# Patient Record
Sex: FEMALE | Race: BLACK OR AFRICAN AMERICAN
[De-identification: names, ages, dates, MRNs, and addresses within clinical notes are randomized per-mention and may not be internally consistent; named-entity substitution may affect disease eponyms.]

---

## 2024-08-02 ENCOUNTER — RESULT REVIEW (OUTPATIENT)
Age: 24
End: 2024-08-02

## 2024-08-02 ENCOUNTER — OUTPATIENT (OUTPATIENT)
Dept: OUTPATIENT SERVICES | Facility: HOSPITAL | Age: 24
LOS: 1 days | End: 2024-08-02
Payer: COMMERCIAL

## 2024-08-02 ENCOUNTER — APPOINTMENT (OUTPATIENT)
Dept: PODIATRY | Facility: CLINIC | Age: 24
End: 2024-08-02

## 2024-08-02 ENCOUNTER — TRANSCRIPTION ENCOUNTER (OUTPATIENT)
Age: 24
End: 2024-08-02

## 2024-08-02 VITALS
DIASTOLIC BLOOD PRESSURE: 71 MMHG | BODY MASS INDEX: 26.98 KG/M2 | HEART RATE: 61 BPM | WEIGHT: 158 LBS | HEIGHT: 64 IN | SYSTOLIC BLOOD PRESSURE: 105 MMHG

## 2024-08-02 DIAGNOSIS — M79.673 PAIN IN UNSPECIFIED FOOT: ICD-10-CM

## 2024-08-02 DIAGNOSIS — G89.29 PAIN IN UNSPECIFIED FOOT: ICD-10-CM

## 2024-08-02 DIAGNOSIS — Z00.00 ENCOUNTER FOR GENERAL ADULT MEDICAL EXAMINATION WITHOUT ABNORMAL FINDINGS: ICD-10-CM

## 2024-08-02 PROCEDURE — 73610 X-RAY EXAM OF ANKLE: CPT | Mod: 26,LT,RT

## 2024-08-02 PROCEDURE — 99204 OFFICE O/P NEW MOD 45 MIN: CPT

## 2024-08-02 PROCEDURE — 73630 X-RAY EXAM OF FOOT: CPT | Mod: 50

## 2024-08-02 PROCEDURE — 73630 X-RAY EXAM OF FOOT: CPT | Mod: 26,LT,RT

## 2024-08-02 PROCEDURE — 73610 X-RAY EXAM OF ANKLE: CPT | Mod: 50

## 2024-08-02 NOTE — PHYSICAL EXAM
[Ankle Swelling (On Exam)] : not present [Varicose Veins Of Lower Extremities] : not present [Delayed in the Right Toes] : capillary refills normal in right toes [Delayed in the Left Toes] : capillary refills normal in the left toes [2+] : left foot dorsalis pedis 2+ [No Joint Swelling] : no joint swelling [Pes Planus] : pes planus deformity [Manjit's Test For Radial Artery Patency Bilaterally] : positive bilateral [de-identified] : Normal ROM to bilateral Ankle, STJ, 1st MTP,  [Skin Color & Pigmentation] : normal skin color and pigmentation [Skin Turgor] : normal skin turgor [] : no rash [Skin Lesions] : no skin lesions [Foot Ulcer] : no foot ulcer [Skin Induration] : no skin induration [Sensation] : the sensory exam was normal to light touch and pinprick [No Focal Deficits] : no focal deficits [Deep Tendon Reflexes (DTR)] : deep tendon reflexes were 2+ and symmetric [Motor Exam] : the motor exam was normal

## 2024-08-02 NOTE — REASON FOR VISIT
[Initial Visit] : an initial visit for [Ankle Pain] : ankle pain [Foot Deformity] : foot deformity [Foot Pain] : foot pain [Pes Planus] : pes planus

## 2024-08-02 NOTE — ASSESSMENT
[FreeTextEntry1] : Pes planus  Chronic foot pain bilateral  Discusses conservative vs surgical options for flat feet; Deformity is correctable passively; ROM all normal. Patient interested in all conservative options at this time.  Recommend; Xray bilateral feet; xray bilateral ankle; Evaluate for pes planus Recommend; Low arch support powerstep orthotics vs custom orthotics  RTC 1 month for xray review and custom orthotic fitting [Verbal] : verbal [Patient] : patient [Good - alert, interested, motivated] : Good - alert, interested, motivated

## 2024-08-02 NOTE — HISTORY OF PRESENT ILLNESS
[Sneakers] : fabi [FreeTextEntry1] : 25 yo F who presents with chronic pain to bilateral feet at the medial aspect of both feet. States she has had "flat feet" since she was 6 years old. Has pain after standing for about 1 hour every day to both feet equally. Patient has not tried any treatments for her pain. Denies trauma. Has no other complaints at this time.

## 2024-08-03 DIAGNOSIS — M79.673 PAIN IN UNSPECIFIED FOOT: ICD-10-CM

## 2024-08-14 ENCOUNTER — NON-APPOINTMENT (OUTPATIENT)
Age: 24
End: 2024-08-14
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
## 2024-08-14 DIAGNOSIS — M21.41 FLAT FOOT [PES PLANUS] (ACQUIRED), RIGHT FOOT: ICD-10-CM
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
## 2024-08-14 DIAGNOSIS — M79.673 PAIN IN UNSPECIFIED FOOT: ICD-10-CM

## 2024-11-04 ENCOUNTER — APPOINTMENT (OUTPATIENT)
Dept: PODIATRY | Facility: CLINIC | Age: 24
End: 2024-11-04

## 2024-11-04 VITALS — BODY MASS INDEX: 27.83 KG/M2 | WEIGHT: 163 LBS | HEIGHT: 64 IN

## 2024-11-04 PROCEDURE — 99213 OFFICE O/P EST LOW 20 MIN: CPT

## 2024-12-02 ENCOUNTER — OUTPATIENT (OUTPATIENT)
Dept: OUTPATIENT SERVICES | Facility: HOSPITAL | Age: 24
LOS: 1 days | End: 2024-12-02
Payer: COMMERCIAL

## 2024-12-02 ENCOUNTER — APPOINTMENT (OUTPATIENT)
Dept: INTERNAL MEDICINE | Facility: CLINIC | Age: 24
End: 2024-12-02
Payer: COMMERCIAL

## 2024-12-02 VITALS
BODY MASS INDEX: 28.34 KG/M2 | WEIGHT: 166 LBS | HEART RATE: 67 BPM | TEMPERATURE: 97 F | DIASTOLIC BLOOD PRESSURE: 71 MMHG | SYSTOLIC BLOOD PRESSURE: 121 MMHG | HEIGHT: 64 IN | OXYGEN SATURATION: 100 %

## 2024-12-02 DIAGNOSIS — Z00.00 ENCOUNTER FOR GENERAL ADULT MEDICAL EXAMINATION WITHOUT ABNORMAL FINDINGS: ICD-10-CM

## 2024-12-02 DIAGNOSIS — G89.29 PAIN IN UNSPECIFIED FOOT: ICD-10-CM

## 2024-12-02 DIAGNOSIS — Z00.00 ENCOUNTER FOR GENERAL ADULT MEDICAL EXAMINATION W/OUT ABNORMAL FINDINGS: ICD-10-CM

## 2024-12-02 DIAGNOSIS — M79.673 PAIN IN UNSPECIFIED FOOT: ICD-10-CM

## 2024-12-02 PROCEDURE — G2211 COMPLEX E/M VISIT ADD ON: CPT | Mod: NC

## 2024-12-02 PROCEDURE — 99203 OFFICE O/P NEW LOW 30 MIN: CPT

## 2024-12-05 DIAGNOSIS — Z00.00 ENCOUNTER FOR GENERAL ADULT MEDICAL EXAMINATION WITHOUT ABNORMAL FINDINGS: ICD-10-CM

## 2024-12-05 DIAGNOSIS — M79.673 PAIN IN UNSPECIFIED FOOT: ICD-10-CM

## 2025-01-06 ENCOUNTER — APPOINTMENT (OUTPATIENT)
Dept: PODIATRY | Facility: CLINIC | Age: 25
End: 2025-01-06
Payer: COMMERCIAL

## 2025-01-06 ENCOUNTER — OUTPATIENT (OUTPATIENT)
Dept: OUTPATIENT SERVICES | Facility: HOSPITAL | Age: 25
LOS: 1 days | End: 2025-01-06
Payer: COMMERCIAL

## 2025-01-06 DIAGNOSIS — M79.673 PAIN IN UNSPECIFIED FOOT: ICD-10-CM

## 2025-01-06 DIAGNOSIS — G89.29 PAIN IN UNSPECIFIED FOOT: ICD-10-CM

## 2025-01-06 DIAGNOSIS — Z00.00 ENCOUNTER FOR GENERAL ADULT MEDICAL EXAMINATION WITHOUT ABNORMAL FINDINGS: ICD-10-CM

## 2025-01-06 PROCEDURE — 99213 OFFICE O/P EST LOW 20 MIN: CPT

## 2025-01-16 DIAGNOSIS — M79.673 PAIN IN UNSPECIFIED FOOT: ICD-10-CM

## 2025-01-16 DIAGNOSIS — R26.2 DIFFICULTY IN WALKING, NOT ELSEWHERE CLASSIFIED: ICD-10-CM

## 2025-01-16 DIAGNOSIS — M79.672 PAIN IN LEFT FOOT: ICD-10-CM

## 2025-01-16 DIAGNOSIS — M77.41 METATARSALGIA, RIGHT FOOT: ICD-10-CM

## 2025-01-16 DIAGNOSIS — M79.671 PAIN IN RIGHT FOOT: ICD-10-CM
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
## 2025-02-03 ENCOUNTER — APPOINTMENT (OUTPATIENT)
Dept: PODIATRY | Facility: CLINIC | Age: 25
End: 2025-02-03
Payer: COMMERCIAL

## 2025-02-03 ENCOUNTER — OUTPATIENT (OUTPATIENT)
Dept: OUTPATIENT SERVICES | Facility: HOSPITAL | Age: 25
LOS: 1 days | End: 2025-02-03
Payer: COMMERCIAL

## 2025-02-03 DIAGNOSIS — M79.673 PAIN IN UNSPECIFIED FOOT: ICD-10-CM

## 2025-02-03 DIAGNOSIS — Z00.00 ENCOUNTER FOR GENERAL ADULT MEDICAL EXAMINATION WITHOUT ABNORMAL FINDINGS: ICD-10-CM

## 2025-02-03 DIAGNOSIS — G89.29 PAIN IN UNSPECIFIED FOOT: ICD-10-CM

## 2025-02-03 PROCEDURE — 99213 OFFICE O/P EST LOW 20 MIN: CPT

## 2025-02-05 DIAGNOSIS — M79.673 PAIN IN UNSPECIFIED FOOT: ICD-10-CM
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
## 2025-02-24 ENCOUNTER — OUTPATIENT (OUTPATIENT)
Dept: OUTPATIENT SERVICES | Facility: HOSPITAL | Age: 25
LOS: 1 days | End: 2025-02-24
Payer: COMMERCIAL

## 2025-02-24 ENCOUNTER — APPOINTMENT (OUTPATIENT)
Dept: PODIATRY | Facility: CLINIC | Age: 25
End: 2025-02-24

## 2025-02-24 DIAGNOSIS — Z00.00 ENCOUNTER FOR GENERAL ADULT MEDICAL EXAMINATION WITHOUT ABNORMAL FINDINGS: ICD-10-CM

## 2025-02-24 PROCEDURE — 99213 OFFICE O/P EST LOW 20 MIN: CPT

## 2025-03-11 DIAGNOSIS — R26.2 DIFFICULTY IN WALKING, NOT ELSEWHERE CLASSIFIED: ICD-10-CM

## 2025-03-11 DIAGNOSIS — M79.672 PAIN IN LEFT FOOT: ICD-10-CM
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
## 2025-03-11 DIAGNOSIS — M77.40 METATARSALGIA, UNSPECIFIED FOOT: ICD-10-CM

## 2025-03-11 DIAGNOSIS — M79.671 PAIN IN RIGHT FOOT: ICD-10-CM

## 2025-03-13 ENCOUNTER — OUTPATIENT (OUTPATIENT)
Dept: OUTPATIENT SERVICES | Facility: HOSPITAL | Age: 25
LOS: 1 days | End: 2025-03-13
Payer: COMMERCIAL

## 2025-03-13 DIAGNOSIS — Z00.00 ENCOUNTER FOR GENERAL ADULT MEDICAL EXAMINATION WITHOUT ABNORMAL FINDINGS: ICD-10-CM

## 2025-03-13 PROCEDURE — 86762 RUBELLA ANTIBODY: CPT

## 2025-03-13 PROCEDURE — 84443 ASSAY THYROID STIM HORMONE: CPT

## 2025-03-13 PROCEDURE — 86765 RUBEOLA ANTIBODY: CPT

## 2025-03-13 PROCEDURE — 86706 HEP B SURFACE ANTIBODY: CPT

## 2025-03-13 PROCEDURE — 83036 HEMOGLOBIN GLYCOSYLATED A1C: CPT

## 2025-03-13 PROCEDURE — 80061 LIPID PANEL: CPT

## 2025-03-13 PROCEDURE — 86787 VARICELLA-ZOSTER ANTIBODY: CPT

## 2025-03-13 PROCEDURE — 86708 HEPATITIS A ANTIBODY: CPT

## 2025-03-13 PROCEDURE — 85025 COMPLETE CBC W/AUTO DIFF WBC: CPT

## 2025-03-13 PROCEDURE — 86705 HEP B CORE ANTIBODY IGM: CPT

## 2025-03-13 PROCEDURE — 80053 COMPREHEN METABOLIC PANEL: CPT

## 2025-03-13 PROCEDURE — 86480 TB TEST CELL IMMUN MEASURE: CPT

## 2025-03-13 PROCEDURE — 86735 MUMPS ANTIBODY: CPT

## 2025-03-13 PROCEDURE — 87340 HEPATITIS B SURFACE AG IA: CPT

## 2025-03-14 DIAGNOSIS — Z00.00 ENCOUNTER FOR GENERAL ADULT MEDICAL EXAMINATION WITHOUT ABNORMAL FINDINGS: ICD-10-CM

## 2025-03-17 ENCOUNTER — APPOINTMENT (OUTPATIENT)
Dept: PODIATRY | Facility: CLINIC | Age: 25
End: 2025-03-17

## 2025-03-24 ENCOUNTER — OUTPATIENT (OUTPATIENT)
Dept: OUTPATIENT SERVICES | Facility: HOSPITAL | Age: 25
LOS: 1 days | End: 2025-03-24
Payer: COMMERCIAL

## 2025-03-24 ENCOUNTER — APPOINTMENT (OUTPATIENT)
Dept: INTERNAL MEDICINE | Facility: CLINIC | Age: 25
End: 2025-03-24
Payer: COMMERCIAL

## 2025-03-24 VITALS
SYSTOLIC BLOOD PRESSURE: 97 MMHG | HEART RATE: 58 BPM | WEIGHT: 160 LBS | TEMPERATURE: 96.6 F | HEIGHT: 64 IN | DIASTOLIC BLOOD PRESSURE: 59 MMHG | OXYGEN SATURATION: 98 % | BODY MASS INDEX: 27.31 KG/M2

## 2025-03-24 DIAGNOSIS — Z00.00 ENCOUNTER FOR GENERAL ADULT MEDICAL EXAMINATION WITHOUT ABNORMAL FINDINGS: ICD-10-CM

## 2025-03-24 DIAGNOSIS — Z00.00 ENCOUNTER FOR GENERAL ADULT MEDICAL EXAMINATION W/OUT ABNORMAL FINDINGS: ICD-10-CM

## 2025-03-24 PROCEDURE — 99213 OFFICE O/P EST LOW 20 MIN: CPT

## 2025-04-01 DIAGNOSIS — Z00.00 ENCOUNTER FOR GENERAL ADULT MEDICAL EXAMINATION WITHOUT ABNORMAL FINDINGS: ICD-10-CM
